# Patient Record
Sex: FEMALE | Race: WHITE | NOT HISPANIC OR LATINO | URBAN - METROPOLITAN AREA
[De-identification: names, ages, dates, MRNs, and addresses within clinical notes are randomized per-mention and may not be internally consistent; named-entity substitution may affect disease eponyms.]

---

## 2017-02-15 ENCOUNTER — IMPORTED ENCOUNTER (OUTPATIENT)
Dept: URBAN - METROPOLITAN AREA CLINIC 50 | Facility: CLINIC | Age: 72
End: 2017-02-15

## 2018-10-10 ENCOUNTER — IMPORTED ENCOUNTER (OUTPATIENT)
Dept: URBAN - METROPOLITAN AREA CLINIC 50 | Facility: CLINIC | Age: 73
End: 2018-10-10

## 2019-11-06 ENCOUNTER — IMPORTED ENCOUNTER (OUTPATIENT)
Dept: URBAN - METROPOLITAN AREA CLINIC 50 | Facility: CLINIC | Age: 74
End: 2019-11-06

## 2021-02-18 ENCOUNTER — IMPORTED ENCOUNTER (OUTPATIENT)
Dept: URBAN - METROPOLITAN AREA CLINIC 50 | Facility: CLINIC | Age: 76
End: 2021-02-18

## 2021-06-14 ASSESSMENT — TONOMETRY
OS_IOP_MMHG: 19
OD_IOP_MMHG: 18
OD_IOP_MMHG: 22
OS_IOP_MMHG: 17
OD_IOP_MMHG: 22
OS_IOP_MMHG: 21
OD_IOP_MMHG: 18
OS_IOP_MMHG: 22

## 2021-06-14 ASSESSMENT — VISUAL ACUITY
OD_BAT: 20/30
OS_BAT: 20/30
OS_CC: J1+
OS_CC: J1+
OS_OTHER: 20/25. 20/25.
OD_BAT: 20/25
OS_OTHER: 20/40. >20/400.
OD_CC: 20/20-2
OD_OTHER: 20/20. 20/20.
OS_BAT: 20/20-1
OD_CC: J1+
OS_CC: 20/20-1
OS_CC: 20/20-1
OD_OTHER: 20/25. >20/400.
OD_CC: 20/20
OS_OTHER: 20/20-1. 20/20-1.
OD_CC: 20/20-
OS_CC: 20/20
OS_CC: J1+
OD_CC: J1+
OD_BAT: 20/20
OD_CC: J1+
OS_OTHER: 20/30. 20/50.
OD_OTHER: 20/20. 20/40.
OD_CC: J1+
OS_CC: J1+
OD_OTHER: 20/30. 20/50.
OS_BAT: 20/25
OD_CC: 20/20-1
OS_BAT: 20/40
OS_CC: 20/20
OD_BAT: 20/20

## 2021-12-06 ENCOUNTER — EMERGENCY VISIT (OUTPATIENT)
Dept: URBAN - METROPOLITAN AREA CLINIC 49 | Facility: CLINIC | Age: 76
End: 2021-12-06

## 2021-12-06 DIAGNOSIS — H10.33: ICD-10-CM

## 2021-12-06 PROCEDURE — 92012 INTRM OPH EXAM EST PATIENT: CPT

## 2021-12-06 ASSESSMENT — TONOMETRY
OD_IOP_MMHG: 19
OS_IOP_MMHG: 19

## 2021-12-06 ASSESSMENT — VISUAL ACUITY
OS_CC: 20/20-2
OD_CC: 20/20-2

## 2021-12-06 NOTE — PATIENT DISCUSSION
Start Flarex 4 times a day to reduce inflammation. Benadryl cream around the eyes but not in the eyes.

## 2021-12-14 ENCOUNTER — FOLLOW UP (OUTPATIENT)
Dept: URBAN - METROPOLITAN AREA CLINIC 53 | Facility: CLINIC | Age: 76
End: 2021-12-14

## 2021-12-14 DIAGNOSIS — H10.33: ICD-10-CM

## 2021-12-14 PROCEDURE — 92012 INTRM OPH EXAM EST PATIENT: CPT

## 2021-12-14 ASSESSMENT — TONOMETRY
OS_IOP_MMHG: 20
OD_IOP_MMHG: 20

## 2021-12-14 ASSESSMENT — VISUAL ACUITY
OS_CC: 20/20-1
OD_CC: 20/25+2

## 2021-12-14 NOTE — PATIENT DISCUSSION
Expressed the concern for being on steroids for to long is cataracts coming earlier than expected or elevated IOP. Reassured her that everything is fine and no progression on either. Will continue her on Flarex. Will have her discontinue to Benadryl cream. Recommended that she use some of the Flarex on her eyelid as well. Will call [employer]ML instead of Flarex secondary to cost and have her use it twice a day. Will see her back in 1-2 weeks for follow up appointment.

## 2021-12-22 ENCOUNTER — FOLLOW UP (OUTPATIENT)
Dept: URBAN - METROPOLITAN AREA CLINIC 53 | Facility: CLINIC | Age: 76
End: 2021-12-22

## 2021-12-22 DIAGNOSIS — H10.33: ICD-10-CM

## 2021-12-22 PROCEDURE — 92012 INTRM OPH EXAM EST PATIENT: CPT

## 2021-12-22 RX ORDER — OLOPATADINE HYDROCHLORIDE 2 MG/ML: 1 SOLUTION OPHTHALMIC ONCE A DAY

## 2021-12-22 ASSESSMENT — VISUAL ACUITY
OD_CC: 20/20
OS_CC: 20/20

## 2021-12-22 ASSESSMENT — TONOMETRY
OS_IOP_MMHG: 17
OD_IOP_MMHG: 17

## 2022-03-22 ENCOUNTER — COMPREHENSIVE EXAM (OUTPATIENT)
Dept: URBAN - METROPOLITAN AREA CLINIC 53 | Facility: CLINIC | Age: 77
End: 2022-03-22

## 2022-03-22 DIAGNOSIS — Z01.01: ICD-10-CM

## 2022-03-22 DIAGNOSIS — H25.813: ICD-10-CM

## 2022-03-22 DIAGNOSIS — H35.362: ICD-10-CM

## 2022-03-22 DIAGNOSIS — H43.813: ICD-10-CM

## 2022-03-22 DIAGNOSIS — H10.33: ICD-10-CM

## 2022-03-22 PROCEDURE — 92014 COMPRE OPH EXAM EST PT 1/>: CPT

## 2022-03-22 PROCEDURE — 92015 DETERMINE REFRACTIVE STATE: CPT

## 2022-03-22 ASSESSMENT — VISUAL ACUITY
OS_GLARE: 20/40
OS_GLARE: 20/25
OS_CC: 20/20
OU_CC: J1+ @ 16 IN
OD_GLARE: 20/25
OD_GLARE: 20/30
OD_CC: 20/25

## 2022-03-22 ASSESSMENT — TONOMETRY
OS_IOP_MMHG: 20
OD_IOP_MMHG: 20

## 2022-03-22 ASSESSMENT — KERATOMETRY
OS_AXISANGLE2_DEGREES: 178
OD_AXISANGLE_DEGREES: 91
OD_K1POWER_DIOPTERS: 43.00
OS_AXISANGLE_DEGREES: 88
OS_K2POWER_DIOPTERS: 45.00
OS_K1POWER_DIOPTERS: 43.50
OD_K2POWER_DIOPTERS: 44.75
OD_AXISANGLE2_DEGREES: 1

## 2022-03-22 NOTE — PATIENT DISCUSSION
Patient states she had some eyelid swelling and used Pred Forte for a couple days in February/2022. Advised patient Pred Alejandroere Ashing is a steroid eye drop and cannot use on a long term basis.

## 2024-04-16 ENCOUNTER — COMPREHENSIVE EXAM (OUTPATIENT)
Dept: URBAN - METROPOLITAN AREA CLINIC 53 | Facility: CLINIC | Age: 79
End: 2024-04-16

## 2024-04-16 DIAGNOSIS — H43.813: ICD-10-CM

## 2024-04-16 DIAGNOSIS — Z01.01: ICD-10-CM

## 2024-04-16 DIAGNOSIS — H52.4: ICD-10-CM

## 2024-04-16 DIAGNOSIS — H25.813: ICD-10-CM

## 2024-04-16 DIAGNOSIS — H35.3131: ICD-10-CM

## 2024-04-16 PROCEDURE — 92014 COMPRE OPH EXAM EST PT 1/>: CPT

## 2024-04-16 PROCEDURE — 92015 DETERMINE REFRACTIVE STATE: CPT

## 2024-04-16 ASSESSMENT — KERATOMETRY
OS_K1POWER_DIOPTERS: 43.25
OD_K1POWER_DIOPTERS: 43.00
OS_AXISANGLE_DEGREES: 88
OD_K2POWER_DIOPTERS: 44.75
OD_AXISANGLE_DEGREES: 93
OD_AXISANGLE2_DEGREES: 3
OS_K2POWER_DIOPTERS: 45.00
OS_AXISANGLE2_DEGREES: 178

## 2024-04-16 ASSESSMENT — VISUAL ACUITY
OS_CC: 20/20
OD_CC: 20/20
OD_GLARE: 20/20
OS_GLARE: 20/20
OS_CC: J1+@14"
OS_GLARE: 20/20
OD_CC: J1+@14"
OU_CC: 20/20
OU_CC: J1+@14"
OD_GLARE: 20/25

## 2024-04-16 ASSESSMENT — TONOMETRY
OD_IOP_MMHG: 17
OS_IOP_MMHG: 17

## 2025-06-03 ENCOUNTER — COMPREHENSIVE EXAM (OUTPATIENT)
Age: 80
End: 2025-06-03

## 2025-06-03 DIAGNOSIS — H52.4: ICD-10-CM

## 2025-06-03 DIAGNOSIS — H35.3131: ICD-10-CM

## 2025-06-03 DIAGNOSIS — Z01.01: ICD-10-CM

## 2025-06-03 DIAGNOSIS — H43.813: ICD-10-CM

## 2025-06-03 DIAGNOSIS — H25.813: ICD-10-CM

## 2025-06-03 PROCEDURE — 92014 COMPRE OPH EXAM EST PT 1/>: CPT

## 2025-06-03 PROCEDURE — 92015 DETERMINE REFRACTIVE STATE: CPT
